# Patient Record
Sex: FEMALE | Race: WHITE | NOT HISPANIC OR LATINO | Employment: UNEMPLOYED | URBAN - METROPOLITAN AREA
[De-identification: names, ages, dates, MRNs, and addresses within clinical notes are randomized per-mention and may not be internally consistent; named-entity substitution may affect disease eponyms.]

---

## 2021-08-20 ENCOUNTER — OFFICE VISIT (OUTPATIENT)
Dept: OBSTETRICS AND GYNECOLOGY | Facility: CLINIC | Age: 36
End: 2021-08-20

## 2021-08-20 VITALS
WEIGHT: 129 LBS | SYSTOLIC BLOOD PRESSURE: 126 MMHG | DIASTOLIC BLOOD PRESSURE: 70 MMHG | HEIGHT: 65 IN | BODY MASS INDEX: 21.49 KG/M2

## 2021-08-20 DIAGNOSIS — Z30.432 ENCOUNTER FOR IUD REMOVAL: Primary | ICD-10-CM

## 2021-08-20 PROCEDURE — 58301 REMOVE INTRAUTERINE DEVICE: CPT | Performed by: STUDENT IN AN ORGANIZED HEALTH CARE EDUCATION/TRAINING PROGRAM

## 2021-08-20 NOTE — PROGRESS NOTES
ParaGard IUD Removal Procedure Note    Indications: Patient desires removal of IUD because she is not currently sexually active and it has lead to her having bloating and painful periods.     Pre Procedural Diagnosis: Encounter for removal of ParaGard IUD     Post Procedural Diagnosis: Successful encounter for removal of ParaGard IUD   Counseling:  Patient was counseled on risks of IUD removal including but not limited to abnormal bleeding, infection, pregnancy, need for additional procedures and/or need for surgery.  All questions were answered to apparent satisfaction.     Procedure Details     Bimanual exam revealed Anteverted.  A speculum was inserted. The IUD strings were seen, grasped with a ring forcep and removed without difficulty.  The ParaGard IUD was inspected and noted to be removed in its entirety.  Patient tolerated procedure well.    IUD Information:  See medication record.    Condition:  Stable    Complications:  None    Plan:    The patient was advised to call for any fever or for prolonged or severe pain or bleeding; she was also advised to use contraception or start PNV if she becomes sexually active without use of contraception. She declines contraception at this time as she is not currently sexually active. She was advised to use OTC analgesics as needed for mild to moderate pain.  Return to the clinic PRN for follow-up.    Patient asked if she would again have a still birth as she reports that she had a still birth at 6 months. She is unsure of the reason for the still birth. I discussed that she is at increased risk of having another still birth and that if she again gets pregnant, she should call the clinic with positive pregnancy test. All questions answered.      used during encounter.    Renetta Keller MD